# Patient Record
Sex: FEMALE | Race: WHITE | URBAN - METROPOLITAN AREA
[De-identification: names, ages, dates, MRNs, and addresses within clinical notes are randomized per-mention and may not be internally consistent; named-entity substitution may affect disease eponyms.]

---

## 2020-09-21 ENCOUNTER — INITIAL CONSULT (OUTPATIENT)
Dept: SURGERY | Age: 50
End: 2020-09-21
Payer: COMMERCIAL

## 2020-09-21 VITALS
WEIGHT: 136.8 LBS | HEIGHT: 68 IN | TEMPERATURE: 97.3 F | SYSTOLIC BLOOD PRESSURE: 130 MMHG | BODY MASS INDEX: 20.73 KG/M2 | DIASTOLIC BLOOD PRESSURE: 86 MMHG

## 2020-09-21 PROCEDURE — G8427 DOCREV CUR MEDS BY ELIG CLIN: HCPCS | Performed by: SURGERY

## 2020-09-21 PROCEDURE — G8420 CALC BMI NORM PARAMETERS: HCPCS | Performed by: SURGERY

## 2020-09-21 PROCEDURE — 99201 PR OFFICE OUTPATIENT NEW 10 MINUTES: CPT | Performed by: SURGERY

## 2020-09-23 ENCOUNTER — TELEPHONE (OUTPATIENT)
Dept: SURGERY | Age: 50
End: 2020-09-23

## 2020-09-23 NOTE — TELEPHONE ENCOUNTER
Received call from referring providers office looking for the OV notes, I let them know we would fax them over once finished.  The fax number is [phone]

## 2020-10-15 NOTE — PROGRESS NOTES
Not on file     Active member of club or organization: Not on file     Attends meetings of clubs or organizations: Not on file     Relationship status: Not on file    Intimate partner violence     Fear of current or ex partner: Not on file     Emotionally abused: Not on file     Physically abused: Not on file     Forced sexual activity: Not on file   Other Topics Concern    Not on file   Social History Narrative    Not on file       History reviewed. No pertinent family history. ROS:  She reports no complaints related to the eyes, ears , nose throat or mouth. She denies weight loss. No chest pain. No SOB. No urinary complaints. No musculoskeletal complaints. Skin complaints include pain L upper back. No neurologic deficits. No bleeding tendencies. No GI complaints. Physical Exam:  Vitals:    09/21/20 1330   BP: 130/86   Temp: 97.3 °F (36.3 °C)   136#  General:  Comfortable. No distress. Eyes:  No scleral icterus  Ears:  Normal  Nose:  Normal  Mouth:  Mucous membranes moist  Respiratory: Lungs CTA. No accessory muscle use. Heart:  Regular rhythm  Abdomen:  Soft. Non tender. Non distended. Musculoskeletal:  No abnormal movements. ROM extremities normal.  Skin:  No rashes. Lesion    Location:   L upper back near scapula   Pigmented:   No    I do not identify a discrete, well circumscribed mass. There is mild tenderness. Neurologic:  No focal deficits. Psychiatric:  AAA. O x 3.            ASSESSMENT:  1. Soft tissue mass            PLAN:  Discussed with the patient that confidence is not there to recommend any surgery to try and explore the area. Well circumscribed mass not appreciated. Etiology of pain may be neurogenic. Possible source is spine. I can follow her up prn if the area becomes more discrete and amenable to excision.